# Patient Record
Sex: FEMALE | Race: WHITE | NOT HISPANIC OR LATINO | Employment: UNEMPLOYED | URBAN - METROPOLITAN AREA
[De-identification: names, ages, dates, MRNs, and addresses within clinical notes are randomized per-mention and may not be internally consistent; named-entity substitution may affect disease eponyms.]

---

## 2018-05-17 ENCOUNTER — APPOINTMENT (OUTPATIENT)
Dept: RADIOLOGY | Facility: MEDICAL CENTER | Age: 63
End: 2018-05-17
Attending: EMERGENCY MEDICINE
Payer: MEDICAID

## 2018-05-17 ENCOUNTER — HOSPITAL ENCOUNTER (EMERGENCY)
Facility: MEDICAL CENTER | Age: 63
End: 2018-05-17
Attending: EMERGENCY MEDICINE
Payer: MEDICAID

## 2018-05-17 VITALS
OXYGEN SATURATION: 95 % | RESPIRATION RATE: 14 BRPM | HEIGHT: 64 IN | TEMPERATURE: 98.4 F | HEART RATE: 59 BPM | WEIGHT: 154 LBS | BODY MASS INDEX: 26.29 KG/M2 | SYSTOLIC BLOOD PRESSURE: 148 MMHG | DIASTOLIC BLOOD PRESSURE: 62 MMHG
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
## 2018-05-17 DIAGNOSIS — S70.02XA CONTUSION OF LEFT HIP, INITIAL ENCOUNTER: ICD-10-CM

## 2018-05-17 DIAGNOSIS — S80.02XA CONTUSION OF LEFT KNEE, INITIAL ENCOUNTER: ICD-10-CM

## 2018-05-17 DIAGNOSIS — S09.90XA CLOSED HEAD INJURY, INITIAL ENCOUNTER: ICD-10-CM

## 2018-05-17 LAB
ALBUMIN SERPL BCP-MCNC: 4.6 G/DL (ref 3.2–4.9)
ALBUMIN/GLOB SERPL: 1.6 G/DL
ALP SERPL-CCNC: 62 U/L (ref 30–99)
ALT SERPL-CCNC: 17 U/L (ref 2–50)
ANION GAP SERPL CALC-SCNC: 10 MMOL/L (ref 0–11.9)
APTT PPP: 22.1 SEC (ref 24.7–36)
AST SERPL-CCNC: 31 U/L (ref 12–45)
BASOPHILS # BLD AUTO: 0.5 % (ref 0–1.8)
BASOPHILS # BLD: 0.03 K/UL (ref 0–0.12)
BILIRUB SERPL-MCNC: 0.5 MG/DL (ref 0.1–1.5)
BNP SERPL-MCNC: 23 PG/ML (ref 0–100)
BUN SERPL-MCNC: 14 MG/DL (ref 8–22)
CALCIUM SERPL-MCNC: 9.5 MG/DL (ref 8.5–10.5)
CHLORIDE SERPL-SCNC: 106 MMOL/L (ref 96–112)
CO2 SERPL-SCNC: 25 MMOL/L (ref 20–33)
COMMENT 1642: NORMAL
CREAT SERPL-MCNC: 0.74 MG/DL (ref 0.5–1.4)
EOSINOPHIL # BLD AUTO: 0 K/UL (ref 0–0.51)
EOSINOPHIL NFR BLD: 0 % (ref 0–6.9)
ERYTHROCYTE [DISTWIDTH] IN BLOOD BY AUTOMATED COUNT: 47.1 FL (ref 35.9–50)
GLOBULIN SER CALC-MCNC: 2.9 G/DL (ref 1.9–3.5)
GLUCOSE SERPL-MCNC: 95 MG/DL (ref 65–99)
HCT VFR BLD AUTO: 39.3 % (ref 37–47)
HGB BLD-MCNC: 12.3 G/DL (ref 12–16)
IMM GRANULOCYTES # BLD AUTO: 0.02 K/UL (ref 0–0.11)
IMM GRANULOCYTES NFR BLD AUTO: 0.3 % (ref 0–0.9)
INR PPP: 1.03 (ref 0.87–1.13)
LYMPHOCYTES # BLD AUTO: 0.88 K/UL (ref 1–4.8)
LYMPHOCYTES NFR BLD: 15.3 % (ref 22–41)
MCH RBC QN AUTO: 30.8 PG (ref 27–33)
MCHC RBC AUTO-ENTMCNC: 31.3 G/DL (ref 33.6–35)
MCV RBC AUTO: 98.5 FL (ref 81.4–97.8)
MONOCYTES # BLD AUTO: 0.43 K/UL (ref 0–0.85)
MONOCYTES NFR BLD AUTO: 7.5 % (ref 0–13.4)
MORPHOLOGY BLD-IMP: NORMAL
NEUTROPHILS # BLD AUTO: 4.4 K/UL (ref 2–7.15)
NEUTROPHILS NFR BLD: 76.4 % (ref 44–72)
NRBC # BLD AUTO: 0 K/UL
NRBC BLD-RTO: 0 /100 WBC
PLATELET # BLD AUTO: 197 K/UL (ref 164–446)
PMV BLD AUTO: 9.9 FL (ref 9–12.9)
POTASSIUM SERPL-SCNC: 4.7 MMOL/L (ref 3.6–5.5)
PROT SERPL-MCNC: 7.5 G/DL (ref 6–8.2)
PROTHROMBIN TIME: 13.2 SEC (ref 12–14.6)
RBC # BLD AUTO: 3.99 M/UL (ref 4.2–5.4)
SODIUM SERPL-SCNC: 141 MMOL/L (ref 135–145)
TROPONIN I SERPL-MCNC: <0.01 NG/ML (ref 0–0.04)
WBC # BLD AUTO: 5.8 K/UL (ref 4.8–10.8)

## 2018-05-17 PROCEDURE — 71045 X-RAY EXAM CHEST 1 VIEW: CPT

## 2018-05-17 PROCEDURE — 85025 COMPLETE CBC W/AUTO DIFF WBC: CPT

## 2018-05-17 PROCEDURE — 99285 EMERGENCY DEPT VISIT HI MDM: CPT

## 2018-05-17 PROCEDURE — 70450 CT HEAD/BRAIN W/O DYE: CPT

## 2018-05-17 PROCEDURE — 73564 X-RAY EXAM KNEE 4 OR MORE: CPT | Mod: LT

## 2018-05-17 PROCEDURE — 83880 ASSAY OF NATRIURETIC PEPTIDE: CPT

## 2018-05-17 PROCEDURE — 85610 PROTHROMBIN TIME: CPT

## 2018-05-17 PROCEDURE — 93005 ELECTROCARDIOGRAM TRACING: CPT | Performed by: EMERGENCY MEDICINE

## 2018-05-17 PROCEDURE — 85730 THROMBOPLASTIN TIME PARTIAL: CPT

## 2018-05-17 PROCEDURE — 72125 CT NECK SPINE W/O DYE: CPT

## 2018-05-17 PROCEDURE — 84484 ASSAY OF TROPONIN QUANT: CPT

## 2018-05-17 PROCEDURE — 73502 X-RAY EXAM HIP UNI 2-3 VIEWS: CPT | Mod: LT

## 2018-05-17 PROCEDURE — 80053 COMPREHEN METABOLIC PANEL: CPT

## 2018-05-17 ASSESSMENT — PAIN SCALES - GENERAL
PAINLEVEL_OUTOF10: 4

## 2018-05-17 ASSESSMENT — LIFESTYLE VARIABLES: DO YOU DRINK ALCOHOL: NO

## 2018-05-17 NOTE — ED NOTES
Pt provided resources by [facility] to include 's resources and homeless shelter.  Pt provided with clothing from [employer].  Pt given d/c instructions and f/u info with verbal understanding.  VSS at discharge.  PIV d/c'd with tip intact.  Pt ambulatory from the ED w/ steady gait accompanied by spouse and son.  Pt has many belongings, all of which are in possession on discharge.  Pt and family escorted to the lobby by RN.

## 2018-05-17 NOTE — ED TRIAGE NOTES
"Daniela Gee 62 y.o. female bib EMS from the bus stop for    Chief Complaint   Patient presents with   • T-5000 GLF     pt tripped on the curb near the bus stop and fell onto L knee and back of head.  Pt denies LOC.     • Knee Pain     L knee   • Neck Pain     pt c/o neck pain and upper back pain, which she has chronically, but was exacerbated by the fall   • Bug Bite     pt has bed bugs, is homeless.  Pt covered in bug bites.  Several bed bugs witnessed on patient and clothing by EMS.     PIV placed by EMS pta with FSBS 134.  Pt denies any other injury.  Pt also requesting some type of cream to help w/ itching and treatment of bed bug bites.  BP (!) 161/69   Pulse 63   Temp 36.3 °C (97.4 °F)   Resp 16   Ht 1.626 m (5' 4\")   Wt 69.9 kg (154 lb)   SpO2 98%   BMI 26.43 kg/m²     "

## 2018-05-17 NOTE — DISCHARGE PLANNING
"Medical Social Work    MSW received call from bedside RN to assist pt and family with resources. MSW met with pt, pt's  and son at bedside. Pt's family wanted hotel voucher. MSW explained that we do not give hotel vouchers. MSW went over housing options including the homeless shelter. Pt;s  stated they \"will not!\" stay in a homeless shelter. Pt and family left [place], [place] and came up to [place] because they heard the housing situation was better. Pt's  angry at  for not more resources in the area.     MSW explained the local resources available. MSW went over [facility] information, [employer], [facility] information as pt's  is . MSW also went over how to use [place] for transportation and gave them the elder resource guide for [facility]. Pt's son stated that have a few 100 dollars they could possibly get hotel room.     No other questions at this time. MSW updated bedside RN.     "

## 2018-05-17 NOTE — ED NOTES
"Plan for patient to shower and go to [facility] for clean room prior to CT.  Pt updated on plan.  Pt's  upset stating that he won't leave his wife's side and \"there's no reason she needs a clean room\".  Spouse cursing and claiming there was a bed bug in the room before she got there (even though he recently arrived to the bedside and was not there on her arrival).  Security called as pt's spouse becoming agitated  "

## 2018-05-17 NOTE — ED PROVIDER NOTES
ED Provider Note    Scribed for Kole Escobar M.D. by Gerald Thomas. 5/17/2018, 8:37 AM.    Primary care provider: No primary care provider noted  Means of arrival: Ambulance  History obtained from: Patient  History limited by: None    CHIEF COMPLAINT  Chief Complaint   Patient presents with   • T-5000 GLF     pt tripped on the curb near the bus stop and fell onto L knee and back of head.  Pt denies LOC.     • Knee Pain     L knee   • Neck Pain     pt c/o neck pain and upper back pain, which she has chronically, but was exacerbated by the fall   • Bug Bite     pt has bed bugs, is homeless.  Pt covered in bug bites.  Several bed bugs witnessed on patient and clothing by EMS.       JUNIOR Gee is a 62 y.o. female who presents to the Emergency Department for evaluation of a ground level fall which occurred just prior to arrival. The patient explains that this morning she was walking to breakfast with her  and son when she tripped on a small curb and fell. The patient did hit her head and she has since been having pain to the back of her head, left hip, and left knee. Patient reports that she is unsure if lost consciousness but her  noted that she was unresponsive for an unknown amount of time. However, she does have a good memory of the incident and denies any numbness, weakness, nausea or vomiting. The patient has a past medical history of hypertension, ulcerative colitis, and hypothyroidism, but she explains that she has been non-compliant with her medications as she is homeless.     She further explains that she has had a red, itchy rash on her forearms, abdomen, and back that has been ongoing for several weeks. Patient attributes this rash to bed bugs. She has not taken any medication for this rash.     REVIEW OF SYSTEMS  Pertinent positives include ground level fall, head trauma, pain in back of head, left knee pain, left hip pain, rash over forearms, abdomen, and back. Pertinent negatives  "include no numbness, weakness, nausea or vomiting. As above, all other systems reviewed and are negative.   See HPI for further details.     C.     PAST MEDICAL HISTORY   has a past medical history of HTN (hypertension) and Hypothyroid.    SURGICAL HISTORY  patient denies any surgical history    SOCIAL HISTORY  Social History   Substance Use Topics   • Smoking status: Never Smoker   • Smokeless tobacco: Never Used   • Alcohol use No      History   Drug Use No       FAMILY HISTORY  History reviewed. No pertinent family history.    CURRENT MEDICATIONS  Home Medications     Reviewed by Genesis Mendez R.N. (Registered Nurse) on 05/17/18 at 0823  Med List Status: Unable to Obtain   Medication Last Dose Status        Patient Jatinder Taking any Medications                       ALLERGIES  Allergies   Allergen Reactions   • Motrin [Ibuprofen]    • Pcn [Penicillins]        PHYSICAL EXAM  VITAL SIGNS: BP (!) 161/69   Pulse 63   Temp 36.3 °C (97.4 °F)   Resp 16   Ht 1.626 m (5' 4\")   Wt 69.9 kg (154 lb)   SpO2 98%   BMI 26.43 kg/m²   Vitals reviewed.  Constitutional: Alert in no apparent distress.  HENT: No signs of trauma, Bilateral external ears normal, Nose normal. No hematoma.   Eyes: Pupils are equal and reactive, Conjunctiva normal, Non-icteric. No hemotympanum.   Neck: Normal range of motion, No tenderness, Supple, No stridor.   Lymphatic: No lymphadenopathy noted.   Cardiovascular: Regular rate and rhythm, no murmurs.   Thorax & Lungs: Normal breath sounds, No respiratory distress, No wheezing, No chest tenderness.   Abdomen: Bowel sounds normal, Soft, No tenderness, No peritoneal signs, No masses, No pulsatile masses.   Skin: Warm, Dry. Rash on abdomen and extremities.    Back: Cervical tenderness in midline and paraspinal muscles. No step offs, no creptius  Extremities: Intact distal pulses, No edema, No tenderness, No cyanosis  Musculoskeletal: No major deformities noted. Decreased active and passive range of " motion left hip. Left knee has abrasions on the patellar area, tender to palpation of left lateral aspect. Decreased range of motion with flexion. Left hip tenderness to palpation and with movement.   Neurologic: Alert , Normal motor function, Normal sensory function, No focal deficits noted. Patient is confused, have to tell her she is in [place]. It is unclear what her baseline is  Psychiatric: Affect normal, Judgment normal, Mood normal.     DIAGNOSTIC STUDIES / PROCEDURES    LABS  Labs Reviewed   CBC WITH DIFFERENTIAL - Abnormal; Notable for the following:        Result Value    RBC 3.99 (*)     MCV 98.5 (*)     MCHC 31.3 (*)     Neutrophils-Polys 76.40 (*)     Lymphocytes 15.30 (*)     Lymphs (Absolute) 0.88 (*)     All other components within normal limits    Narrative:     Indicate which anticoagulants the patient is on:->UNKNOWN   APTT - Abnormal; Notable for the following:     APTT 22.1 (*)     All other components within normal limits    Narrative:     Indicate which anticoagulants the patient is on:->UNKNOWN   COMP METABOLIC PANEL    Narrative:     Indicate which anticoagulants the patient is on:->UNKNOWN   BTYPE NATRIURETIC PEPTIDE    Narrative:     Indicate which anticoagulants the patient is on:->UNKNOWN   PROTHROMBIN TIME    Narrative:     Indicate which anticoagulants the patient is on:->UNKNOWN   TROPONIN    Narrative:     Indicate which anticoagulants the patient is on:->UNKNOWN   ESTIMATED GFR    Narrative:     Indicate which anticoagulants the patient is on:->UNKNOWN   PERIPHERAL SMEAR REVIEW    Narrative:     Indicate which anticoagulants the patient is on:->UNKNOWN   DIFFERENTIAL COMMENT    Narrative:     Indicate which anticoagulants the patient is on:->UNKNOWN      All labs reviewed by me.    EKG Interpretation:  Interpreted by me    12 Lead EKG interpreted by me to show:  Normal sinus rhythm  Rate 61  Axis: Normal  Intervals: Normal  Normal T waves  LVH. ST elevation in leads V1 and V2, likely  indicative of LVH with strain, non specific inferior T wave abnormalities  My impression of this EKG: Does not indicate ischemia or arrhythmia at this time.    RADIOLOGY  CT-CSPINE WITHOUT PLUS RECONS   Final Result      Degenerative change without evidence of fracture.      CT-HEAD W/O   Final Result      1.  Cerebral atrophy.      2.  Otherwise, Head CT without contrast within normal limits. No evidence of acute cerebral infarction, hemorrhage or mass lesion.      DX-KNEE COMPLETE 4+ LEFT   Final Result      Unremarkable knee series.                  INTERPRETING LOCATION:  [address] [facility], [place], [address]      DX-HIP-COMPLETE - UNILATERAL 2+ LEFT   Final Result      No radiographic evidence of acute traumatic injury.      DX-CHEST-PORTABLE (1 VIEW)   Final Result      No acute cardiac or pulmonary abnormalities are identified.        The radiologist's interpretation of all radiological studies have been reviewed by me.    COURSE & MEDICAL DECISION MAKING  Nursing notes, VS, PMSFHx reviewed in chart.  Differential diagnoses include but not limited to: Intracranial hemorrhage, C-spine fracture, knee fracture, hip fracture, electrolyte abnormality.     8:37 AM Patient seen and examined at bedside. Ordered for DX-chest (1 view), DX-hip-complete - unilateral 2+ left, DX-knee complete 4+ left, CT-head w/o, CT-Cspine without plus recons, CBC with differential, CMP, BNP, PT/INR, APTT, Troponin STAT, EKG to evaluate.     1:03 PM Paged Case Management.     The patient walked and showered without assistance. She is discharged with her family after receiving resources from .    The patient will return for new or worsening symptoms and is stable at the time of discharge. Please follow up with a primary physician for blood pressure management, diabetic screening, and all other preventive health concerns. Patient has known hypertension that is being followed by her primary care provider.       DISPOSITION:  Patient  will be discharged home in stable condition.    FOLLOW UP:  [facility], Emergency Dept  [address] [facility] [address] [phone]    If symptoms worsen    [facility]  [address] [address]  [phone]    call for appointment to establish a primary care doctor    FINAL IMPRESSION  1. Fall, initial encounter    2. Contusion of left knee, initial encounter    3. Contusion of left hip, initial encounter    4. Closed head injury, initial encounter          IGerald (Scribe), am scribing for, and in the presence of, Kole Escobar M.D..    Electronically signed by: Gerald Thomas ([place]), 5/17/2018    IKole M.D. personally performed the services described in this documentation, as scribed by Gerald Thomas in my presence, and it is both accurate and complete.    The note accurately reflects work and decisions made by me.  Kole Escobar  5/17/2018  2:11 PM

## 2018-05-17 NOTE — ED NOTES
Plan for patient discharge.  Pt back to [facility].  Pt requesting resources/information for homeless shelter.  ER  aware.

## 2018-06-03 LAB — EKG IMPRESSION: NORMAL

## 2019-11-15 NOTE — ED NOTES
Pt has been showered and changed into clean gown.  Pt placed in [facility].  CT and x-ray aware.   Dressing: bandage Render Post-Care Instructions In Note?: no Anesthesia Volume In Cc (Will Not Render If 0): 0.5 Electrodesiccation And Curettage Text: The wound bed was treated with electrodesiccation and curettage after the biopsy was performed. Curettage Text: The wound bed was treated with curettage after the biopsy was performed. Billing Type: Third-Party Bill Detail Level: Detailed Was A Bandage Applied: Yes Hemostasis: Drysol Biopsy Method: Dermablade Silver Nitrate Text: The wound bed was treated with silver nitrate after the biopsy was performed. Anesthesia Type: 1% lidocaine with epinephrine Additional Anesthesia Volume In Cc (Will Not Render If 0): 0 Lab Facility: 3 Wound Care: Petrolatum Lab: -52 Post-Care Instructions: I reviewed with the patient in detail post-care instructions. Patient is to keep the biopsy site dry overnight, and then apply bacitracin twice daily until healed. Patient may apply hydrogen peroxide soaks to remove any crusting. Cryotherapy Text: The wound bed was treated with cryotherapy after the biopsy was performed. Biopsy Type: H and E Type Of Destruction Used: Curettage Consent: Written consent was obtained and risks were reviewed including but not limited to scarring, infection, bleeding, scabbing, incomplete removal, nerve damage and allergy to anesthesia. Notification Instructions: Patient will be notified of biopsy results. However, patient instructed to call the office if not contacted within 2 weeks. Electrodesiccation Text: The wound bed was treated with electrodesiccation after the biopsy was performed. Depth Of Biopsy: dermis

## 2021-03-03 DIAGNOSIS — Z23 NEED FOR VACCINATION: ICD-10-CM
